# Patient Record
Sex: MALE | Race: WHITE | NOT HISPANIC OR LATINO | Employment: OTHER | URBAN - METROPOLITAN AREA
[De-identification: names, ages, dates, MRNs, and addresses within clinical notes are randomized per-mention and may not be internally consistent; named-entity substitution may affect disease eponyms.]

---

## 2017-09-22 ENCOUNTER — PATIENT MESSAGE (OUTPATIENT)
Dept: HEALTH INFORMATION MANAGEMENT | Facility: OTHER | Age: 77
End: 2017-09-22

## 2017-10-18 PROBLEM — M25.511 RIGHT SHOULDER PAIN: Status: ACTIVE | Noted: 2017-10-18

## 2017-10-18 PROBLEM — M25.611 SHOULDER STIFFNESS, RIGHT: Status: ACTIVE | Noted: 2017-10-18

## 2017-11-29 PROBLEM — R19.7 DIARRHEA: Status: ACTIVE | Noted: 2017-11-29

## 2020-12-09 PROBLEM — I25.10 CORONARY ARTERY DISEASE INVOLVING NATIVE HEART WITHOUT ANGINA PECTORIS: Status: ACTIVE | Noted: 2020-12-09

## 2021-10-18 ENCOUNTER — TELEPHONE (OUTPATIENT)
Dept: HEALTH INFORMATION MANAGEMENT | Facility: OTHER | Age: 81
End: 2021-10-18

## 2021-10-18 NOTE — TELEPHONE ENCOUNTER
Outcome: scheduled annual wellness   Please transfer to Patient Outreach Team at [phone] when patient returns call.        Attempt # 1

## 2021-11-29 PROBLEM — I10 ESSENTIAL HYPERTENSION: Status: ACTIVE | Noted: 2021-08-06

## 2021-11-29 PROBLEM — Z79.4 TYPE 2 DIABETES MELLITUS, WITH LONG-TERM CURRENT USE OF INSULIN (HCC): Status: ACTIVE | Noted: 2021-08-06

## 2021-11-29 PROBLEM — E11.9 TYPE 2 DIABETES MELLITUS, WITH LONG-TERM CURRENT USE OF INSULIN (HCC): Status: ACTIVE | Noted: 2021-08-06

## 2021-11-29 PROBLEM — I51.89 DIASTOLIC DYSFUNCTION: Status: ACTIVE | Noted: 2021-08-07

## 2021-11-29 PROBLEM — R00.2 PALPITATIONS: Status: ACTIVE | Noted: 2021-08-06

## 2021-11-29 PROBLEM — I34.0 MITRAL REGURGITATION: Status: ACTIVE | Noted: 2021-08-07

## 2021-11-29 PROBLEM — Z95.5 HISTORY OF HEART ARTERY STENT: Status: ACTIVE | Noted: 2021-08-06

## 2021-11-29 PROBLEM — N18.31 STAGE 3A CHRONIC KIDNEY DISEASE: Status: ACTIVE | Noted: 2021-08-06

## 2021-11-29 PROBLEM — I25.2 HISTORY OF MI (MYOCARDIAL INFARCTION): Status: ACTIVE | Noted: 2021-08-06

## 2021-11-29 PROBLEM — I49.3 FREQUENT PVCS: Status: ACTIVE | Noted: 2021-08-06

## 2021-11-29 PROBLEM — I65.23 BILATERAL CAROTID ARTERY STENOSIS: Status: ACTIVE | Noted: 2021-08-07

## 2022-03-01 PROBLEM — R20.0 LEFT ARM NUMBNESS: Status: ACTIVE | Noted: 2022-01-20

## 2022-03-01 PROBLEM — N40.0 BPH (BENIGN PROSTATIC HYPERPLASIA): Status: ACTIVE | Noted: 2022-01-02

## 2022-03-01 PROBLEM — R07.9 CHEST PAIN: Status: ACTIVE | Noted: 2022-01-02

## 2022-03-01 PROBLEM — E78.49 OTHER HYPERLIPIDEMIA: Status: ACTIVE | Noted: 2022-01-20

## 2022-09-20 PROBLEM — R42 DIZZINESS: Status: ACTIVE | Noted: 2022-07-08

## 2023-05-25 PROBLEM — M51.16 LUMBAR DISC HERNIATION WITH RADICULOPATHY: Status: ACTIVE | Noted: 2023-05-25

## 2024-01-17 PROBLEM — I25.9 CHRONIC ISCHEMIC HEART DISEASE, UNSPECIFIED: Status: ACTIVE | Noted: 2024-01-17
